# Patient Record
Sex: MALE | Race: WHITE | URBAN - METROPOLITAN AREA
[De-identification: names, ages, dates, MRNs, and addresses within clinical notes are randomized per-mention and may not be internally consistent; named-entity substitution may affect disease eponyms.]

---

## 2022-09-01 ENCOUNTER — OFFICE VISIT (OUTPATIENT)
Dept: URBAN - METROPOLITAN AREA CLINIC 23 | Facility: CLINIC | Age: 67
End: 2022-09-01
Payer: COMMERCIAL

## 2022-09-01 DIAGNOSIS — H18.423 BAND KERATOPATHY, BILATERAL: Primary | ICD-10-CM

## 2022-09-01 PROCEDURE — 99204 OFFICE O/P NEW MOD 45 MIN: CPT | Performed by: OPTOMETRIST

## 2022-09-01 ASSESSMENT — INTRAOCULAR PRESSURE
OD: 12
OS: 13

## 2022-09-01 ASSESSMENT — KERATOMETRY
OS: 44.00
OD: 43.13

## 2022-09-01 NOTE — IMPRESSION/PLAN
Impression: Band keratopathy, bilateral: F43.002. Plan: Discussed diagnosis and treatment options with pt. Recommend cornea consult with Dr. Jose Lopez to receive further tx.

## 2022-09-19 ENCOUNTER — OFFICE VISIT (OUTPATIENT)
Dept: URBAN - METROPOLITAN AREA CLINIC 23 | Facility: CLINIC | Age: 67
End: 2022-09-19
Payer: COMMERCIAL

## 2022-09-19 DIAGNOSIS — H18.423 BAND KERATOPATHY, BILATERAL: Primary | ICD-10-CM

## 2022-09-19 PROCEDURE — 76514 ECHO EXAM OF EYE THICKNESS: CPT | Performed by: OPHTHALMOLOGY

## 2022-09-19 PROCEDURE — 99203 OFFICE O/P NEW LOW 30 MIN: CPT | Performed by: OPHTHALMOLOGY

## 2022-09-19 PROCEDURE — 92025 CPTRIZED CORNEAL TOPOGRAPHY: CPT | Performed by: OPHTHALMOLOGY

## 2022-09-19 RX ORDER — PREDNISOLONE ACETATE 10 MG/ML
1 % SUSPENSION/ DROPS OPHTHALMIC
Qty: 10 | Refills: 1 | Status: ACTIVE
Start: 2022-09-19

## 2022-09-19 RX ORDER — OFLOXACIN 3 MG/ML
0.3 % SOLUTION/ DROPS OPHTHALMIC
Qty: 5 | Refills: 1 | Status: ACTIVE
Start: 2022-09-19

## 2022-09-19 ASSESSMENT — INTRAOCULAR PRESSURE
OD: 18
OS: 18

## 2022-09-19 NOTE — IMPRESSION/PLAN
Impression: Band keratopathy, bilateral: G74.829. Condition: quality of life issue. Symptoms: may improve with surgery. Plan: Corneal OCT and Topography OU ordered and performed today and results discussed with patient. Discussed diagnosis in detail with patient. Discussed treatment options with patient. Surgery not indicated, will monitor. Surgical risks and benefits were discussed, explained and understood by patient. Patient elects to have surgery. RL-2 Recommend observation at this time OS.

## 2022-12-08 ENCOUNTER — SURGERY (OUTPATIENT)
Dept: URBAN - METROPOLITAN AREA SURGERY 11 | Facility: SURGERY | Age: 67
End: 2022-12-08
Payer: COMMERCIAL

## 2022-12-08 PROCEDURE — 65400 REMOVAL OF EYE LESION: CPT | Performed by: OPHTHALMOLOGY

## 2022-12-08 RX ORDER — HYDROCODONE BITARTRATE AND ACETAMINOPHEN 10; 325 MG/1; MG/1
TABLET ORAL
Qty: 10 | Refills: 0 | Status: ACTIVE
Start: 2022-12-08

## 2022-12-13 ENCOUNTER — POST-OPERATIVE VISIT (OUTPATIENT)
Dept: URBAN - METROPOLITAN AREA CLINIC 23 | Facility: CLINIC | Age: 67
End: 2022-12-13
Payer: COMMERCIAL

## 2022-12-13 DIAGNOSIS — Z48.810 ENCOUNTER FOR SURGICAL AFTERCARE FOLLOWING SURGERY ON A SENSE ORGAN: Primary | ICD-10-CM

## 2022-12-13 PROCEDURE — 99024 POSTOP FOLLOW-UP VISIT: CPT | Performed by: OPTOMETRIST

## 2022-12-13 NOTE — IMPRESSION/PLAN
Impression: S/P Superficial keratectomy OD - 5 Days. Encounter for surgical aftercare following surgery on a sense organ  Z48.810. Plan: Pt edu. Appropriate appearance. Removed BCL with no complications OD. Continue Pred Acetate and Ofloxacin as prescribed. Use AT's PRN OU for comfort. Advised patient to call with any issues. RTC as scheduled.

## 2023-01-03 ENCOUNTER — POST-OPERATIVE VISIT (OUTPATIENT)
Dept: URBAN - METROPOLITAN AREA CLINIC 23 | Facility: CLINIC | Age: 68
End: 2023-01-03
Payer: COMMERCIAL

## 2023-01-03 DIAGNOSIS — Z48.810 ENCOUNTER FOR SURGICAL AFTERCARE FOLLOWING SURGERY ON A SENSE ORGAN: Primary | ICD-10-CM

## 2023-01-03 PROCEDURE — 99024 POSTOP FOLLOW-UP VISIT: CPT | Performed by: OPTOMETRIST

## 2023-01-03 ASSESSMENT — INTRAOCULAR PRESSURE: OD: 20

## 2023-01-03 NOTE — IMPRESSION/PLAN
Impression: S/P Superficial keratectomy OD - 26 Days. Encounter for surgical aftercare following surgery on a sense organ  Z48.810. Plan: Pt edu. Appropriate appearance and IOP. Use AT's PRN OU for comfort. New srx was given today. Advised patient to call with any issues. RTC 1 year DFE.

## 2023-01-30 ENCOUNTER — OFFICE VISIT (OUTPATIENT)
Dept: URBAN - METROPOLITAN AREA CLINIC 23 | Facility: CLINIC | Age: 68
End: 2023-01-30
Payer: COMMERCIAL

## 2023-01-30 DIAGNOSIS — H52.223 REGULAR ASTIGMATISM, BILATERAL: Primary | ICD-10-CM

## 2023-01-30 PROCEDURE — V2799 MISC VISION ITEM OR SERVICE: HCPCS | Performed by: OPTOMETRIST

## 2023-01-30 ASSESSMENT — VISUAL ACUITY: OS: 20/30

## 2023-01-30 ASSESSMENT — KERATOMETRY: OD: 45.13

## 2023-08-21 ENCOUNTER — OFFICE VISIT (OUTPATIENT)
Dept: URBAN - METROPOLITAN AREA CLINIC 23 | Facility: CLINIC | Age: 68
End: 2023-08-21
Payer: COMMERCIAL

## 2023-08-21 DIAGNOSIS — H18.423 BAND KERATOPATHY, BILATERAL: Primary | ICD-10-CM

## 2023-08-21 PROCEDURE — 99213 OFFICE O/P EST LOW 20 MIN: CPT | Performed by: OPTOMETRIST

## 2023-08-21 PROCEDURE — 92025 CPTRIZED CORNEAL TOPOGRAPHY: CPT | Performed by: OPTOMETRIST

## 2023-08-21 ASSESSMENT — INTRAOCULAR PRESSURE
OD: 19
OS: 17

## 2023-09-11 ENCOUNTER — OFFICE VISIT (OUTPATIENT)
Dept: URBAN - METROPOLITAN AREA CLINIC 23 | Facility: CLINIC | Age: 68
End: 2023-09-11
Payer: COMMERCIAL

## 2023-09-11 DIAGNOSIS — H18.423 BAND KERATOPATHY, BILATERAL: Primary | ICD-10-CM

## 2023-09-11 PROCEDURE — 99214 OFFICE O/P EST MOD 30 MIN: CPT | Performed by: OPHTHALMOLOGY

## 2023-09-11 ASSESSMENT — INTRAOCULAR PRESSURE
OD: 19
OS: 18

## 2023-09-11 ASSESSMENT — KERATOMETRY
OS: 44.13
OD: 45.25

## 2023-09-29 ENCOUNTER — SURGERY (OUTPATIENT)
Dept: URBAN - METROPOLITAN AREA SURGERY 11 | Facility: SURGERY | Age: 68
End: 2023-09-29
Payer: COMMERCIAL

## 2023-09-29 PROCEDURE — 65400 REMOVAL OF EYE LESION: CPT | Performed by: OPHTHALMOLOGY

## 2023-10-04 ENCOUNTER — POST-OPERATIVE VISIT (OUTPATIENT)
Dept: URBAN - METROPOLITAN AREA CLINIC 23 | Facility: CLINIC | Age: 68
End: 2023-10-04
Payer: COMMERCIAL

## 2023-10-04 DIAGNOSIS — Z48.810 ENCOUNTER FOR SURGICAL AFTERCARE FOLLOWING SURGERY ON A SENSE ORGAN: Primary | ICD-10-CM

## 2023-10-04 PROCEDURE — 99024 POSTOP FOLLOW-UP VISIT: CPT | Performed by: OPHTHALMOLOGY

## 2023-11-29 ENCOUNTER — POST-OPERATIVE VISIT (OUTPATIENT)
Dept: URBAN - METROPOLITAN AREA CLINIC 23 | Facility: CLINIC | Age: 68
End: 2023-11-29
Payer: COMMERCIAL

## 2023-11-29 DIAGNOSIS — Z48.810 ENCOUNTER FOR SURGICAL AFTERCARE FOLLOWING SURGERY ON A SENSE ORGAN: Primary | ICD-10-CM

## 2023-11-29 PROCEDURE — 99024 POSTOP FOLLOW-UP VISIT: CPT | Performed by: OPHTHALMOLOGY

## 2023-11-29 ASSESSMENT — INTRAOCULAR PRESSURE
OD: 20
OS: 18

## 2024-02-16 ENCOUNTER — OFFICE VISIT (OUTPATIENT)
Dept: URBAN - METROPOLITAN AREA CLINIC 23 | Facility: CLINIC | Age: 69
End: 2024-02-16
Payer: COMMERCIAL

## 2024-02-16 DIAGNOSIS — H52.223 REGULAR ASTIGMATISM, BILATERAL: ICD-10-CM

## 2024-02-16 DIAGNOSIS — H18.423 BAND KERATOPATHY, BILATERAL: Primary | ICD-10-CM

## 2024-02-16 PROCEDURE — 99213 OFFICE O/P EST LOW 20 MIN: CPT | Performed by: OPTOMETRIST

## 2024-02-16 ASSESSMENT — KERATOMETRY
OD: 44.13
OS: 46.63

## 2024-02-16 ASSESSMENT — VISUAL ACUITY
OD: 20/25
OS: 20/25

## 2024-02-16 ASSESSMENT — INTRAOCULAR PRESSURE
OD: 19
OS: 19

## 2025-03-04 ENCOUNTER — OFFICE VISIT (OUTPATIENT)
Dept: URBAN - METROPOLITAN AREA CLINIC 23 | Facility: CLINIC | Age: 70
End: 2025-03-04
Payer: COMMERCIAL

## 2025-03-04 DIAGNOSIS — H52.223 REGULAR ASTIGMATISM, BILATERAL: ICD-10-CM

## 2025-03-04 DIAGNOSIS — H18.423 BAND KERATOPATHY, BILATERAL: Primary | ICD-10-CM

## 2025-03-04 ASSESSMENT — KERATOMETRY
OS: 43.50
OD: 45.13

## 2025-03-04 ASSESSMENT — VISUAL ACUITY
OD: 20/25
OS: 20/25

## 2025-03-04 ASSESSMENT — INTRAOCULAR PRESSURE
OS: 18
OD: 19